# Patient Record
Sex: FEMALE | Race: WHITE | URBAN - METROPOLITAN AREA
[De-identification: names, ages, dates, MRNs, and addresses within clinical notes are randomized per-mention and may not be internally consistent; named-entity substitution may affect disease eponyms.]

---

## 2017-05-15 ENCOUNTER — EMERGENCY (EMERGENCY)
Facility: HOSPITAL | Age: 32
LOS: 1 days | Discharge: HOME | End: 2017-05-15
Attending: EMERGENCY MEDICINE | Admitting: EMERGENCY MEDICINE
Payer: COMMERCIAL

## 2017-05-15 VITALS
OXYGEN SATURATION: 100 % | RESPIRATION RATE: 16 BRPM | DIASTOLIC BLOOD PRESSURE: 78 MMHG | TEMPERATURE: 98 F | SYSTOLIC BLOOD PRESSURE: 125 MMHG | HEART RATE: 80 BPM

## 2017-05-15 VITALS
RESPIRATION RATE: 16 BRPM | DIASTOLIC BLOOD PRESSURE: 64 MMHG | HEART RATE: 87 BPM | TEMPERATURE: 98 F | OXYGEN SATURATION: 100 % | SYSTOLIC BLOOD PRESSURE: 103 MMHG

## 2017-05-15 PROCEDURE — 99284 EMERGENCY DEPT VISIT MOD MDM: CPT | Mod: 25

## 2017-05-15 PROCEDURE — 93010 ELECTROCARDIOGRAM REPORT: CPT | Mod: 59

## 2017-05-15 RX ORDER — MECLIZINE HCL 12.5 MG
1 TABLET ORAL
Qty: 10 | Refills: 0 | OUTPATIENT
Start: 2017-05-15

## 2017-05-15 RX ORDER — MECLIZINE HCL 12.5 MG
25 TABLET ORAL ONCE
Qty: 0 | Refills: 0 | Status: COMPLETED | OUTPATIENT
Start: 2017-05-15 | End: 2017-05-15

## 2017-05-15 RX ADMIN — Medication 25 MILLIGRAM(S): at 22:54

## 2017-05-15 NOTE — ED PROVIDER NOTE - ATTENDING CONTRIBUTION TO CARE
zakiya: 2 week hx of decreased hearing left ear and nightly vertigo. today while lying down, acute onset severe vertigo; unable to walk due to unsteadiness; vomited. sx slowly diminished.  In January pt noted discomfort left ear, told to take claritin.  PMH unremarkable  exam: NAD. no nystagmus. TMS clear. gait: slight unsteadiness on heel to toe. neuro exam otherwise normal including finger to nose.   Impression: eval c/w peripheral labrynthitis. requires opt ent follow-up. will rx meclizine. zakiya: 2 week hx of decreased hearing left ear and nightly vertigo. today while lying down, acute onset severe vertigo; unable to walk due to unsteadiness; vomited. sx slowly diminished.  In January pt noted discomfort left ear, told to take claritin.  PMH unremarkable.  exam: NAD. no nystagmus. TMS clear. gait: slight unsteadiness on heel to toe. neuro exam otherwise normal including finger to nose.   Impression: eval c/w peripheral labrynthitis. requires opt ent follow-up. will rx meclizine.

## 2017-05-15 NOTE — ED ADULT NURSE NOTE - OBJECTIVE STATEMENT
33 y/o female pt, aox3, ambulatory, presents to the ED with c/o dizziness x 2 weeks, as if the room is spinning, s/p fall at home today, denies hitting head/LOC, also reports mild diffuse abd pain and 4 episodes of vomiting today related to the dizziness, noticed a small amt of blood with the vomit. Pt denies any chest pain, SOB, fever, chills. MD tio grace, comfortable in stretcher, will cont to monitor 33 y/o female pt, aox3, ambulatory, presents to the ED with c/o dizziness, ringing on the L ear x 2 weeks, as if the room is spinning, s/p fall at home today, denies hitting head/LOC, also reports mild diffuse abd pain and 4 episodes of vomiting today related to the dizziness, noticed a small amt of blood with the vomit. Pt denies any chest pain, SOB, fever, chills. MD tio grace, comfortable in stretcher, will cont to monitor

## 2017-05-15 NOTE — ED PROVIDER NOTE - OBJECTIVE STATEMENT
31yo F with no PMH p/w vertigo x1d. Pt states that she's had occasional episode of vertigo for the past week a/w decreased hearing in the L ear. Today she had an episode while she was lying on the couch, tried to get up but any movement made it worse, and she had multiple episodes of vomiting. She states that she fell off the couch while trying to get up, but denies any head trauma. She's now feeling much better. No current nausea, abd pain, CP/SOB.

## 2017-05-15 NOTE — ED ADULT TRIAGE NOTE - CHIEF COMPLAINT QUOTE
Pt arrives w/ c/o ear pain, lightheadedness, dizziness, nausea for two weeks. Rpts an episode of syncope tonight while in the bathroom. Pt denies hitting head when she fell to the floor.

## 2017-05-15 NOTE — ED PROVIDER NOTE - MEDICAL DECISION MAKING DETAILS
Pt p/w vertigo and L sided hearing changes. Ddx meniere's, BPPV. Central cause unlikely given normal neuro exam and no risk factors. Will give meclizine and dc with ENT fu.

## 2021-11-17 PROBLEM — Z00.00 ENCOUNTER FOR PREVENTIVE HEALTH EXAMINATION: Status: ACTIVE | Noted: 2021-11-17

## 2021-11-21 ENCOUNTER — NON-APPOINTMENT (OUTPATIENT)
Age: 36
End: 2021-11-21

## 2021-11-22 ENCOUNTER — APPOINTMENT (OUTPATIENT)
Dept: ORTHOPEDIC SURGERY | Facility: CLINIC | Age: 36
End: 2021-11-22